# Patient Record
Sex: MALE | Race: BLACK OR AFRICAN AMERICAN | Employment: UNEMPLOYED | URBAN - METROPOLITAN AREA
[De-identification: names, ages, dates, MRNs, and addresses within clinical notes are randomized per-mention and may not be internally consistent; named-entity substitution may affect disease eponyms.]

---

## 2017-12-27 ENCOUNTER — HOSPITAL ENCOUNTER (EMERGENCY)
Facility: CLINIC | Age: 42
Discharge: HOME | End: 2017-12-28
Attending: EMERGENCY MEDICINE | Admitting: EMERGENCY MEDICINE
Payer: MEDICARE

## 2017-12-27 DIAGNOSIS — R07.9 CHEST PAIN, UNSPECIFIED TYPE: ICD-10-CM

## 2017-12-27 DIAGNOSIS — R07.89 OTHER CHEST PAIN: ICD-10-CM

## 2017-12-27 LAB — TROPONIN I BLD-MCNC: 0 UG/L (ref 0–0.1)

## 2017-12-27 PROCEDURE — 93005 ELECTROCARDIOGRAM TRACING: CPT

## 2017-12-27 PROCEDURE — 80048 BASIC METABOLIC PNL TOTAL CA: CPT | Performed by: EMERGENCY MEDICINE

## 2017-12-27 PROCEDURE — 84484 ASSAY OF TROPONIN QUANT: CPT

## 2017-12-27 PROCEDURE — 84484 ASSAY OF TROPONIN QUANT: CPT | Mod: 91 | Performed by: EMERGENCY MEDICINE

## 2017-12-27 PROCEDURE — 99285 EMERGENCY DEPT VISIT HI MDM: CPT | Mod: 25 | Performed by: EMERGENCY MEDICINE

## 2017-12-27 PROCEDURE — 85025 COMPLETE CBC W/AUTO DIFF WBC: CPT | Performed by: EMERGENCY MEDICINE

## 2017-12-27 PROCEDURE — 99285 EMERGENCY DEPT VISIT HI MDM: CPT | Mod: 25

## 2017-12-27 PROCEDURE — 93010 ELECTROCARDIOGRAM REPORT: CPT | Mod: Z6 | Performed by: EMERGENCY MEDICINE

## 2017-12-27 NOTE — ED AVS SNAPSHOT
[facility], Emergency Department    [address]    [place] [address]    Phone:  [phone]    Fax:  [phone]                                       Mary Patrick   MRN: [id number]    Department:  [facility], Emergency Department   Date of Visit:  12/27/2017           Patient Information     Date Of Birth          1975        Your diagnoses for this visit were:     Chest pain, unspecified type        You were seen by Shree Huertas MD.        Discharge Instructions       Please make an appointment to follow up with Your Primary Care Provider to discuss your chest pain and whether you need a stress test.    24 Hour Appointment Hotline       To make an appointment at any [facility], call [phone] ([phone]). If you don't have a family doctor or clinic, we will help you find one. [facility] clinics are conveniently located to serve the needs of you and your family.             Review of your medicines      Our records show that you are taking the medicines listed below. If these are incorrect, please call your family doctor or clinic.        Dose / Directions Last dose taken    ASPIRIN PO   Dose:  81 mg        Take 81 mg by mouth   Refills:  0        LISINOPRIL PO   Dose:  30 mg        Take 30 mg by mouth   Refills:  0        meclizine 12.5 MG tablet   Commonly known as:  ANTIVERT   Dose:  12.5 mg   Quantity:  30 tablet        Take 1 tablet (12.5 mg) by mouth 4 times daily as needed for dizziness   Refills:  0                Procedures and tests performed during your visit     Basic metabolic panel    CBC with platelets differential    Chest XR,  PA & LAT    EKG 12 lead    Troponin I    Troponin POCT      Orders Needing Specimen Collection     None      Pending Results     Date and Time Order Name Status Description    12/28/2017 0011 Chest XR,  PA & LAT Preliminary             Pending Culture Results     No orders found for last 3 day(s).            Pending  "Results Instructions     If you had any lab results that were not finalized at the time of your Discharge, you can call the ED Lab Result RN at [phone]. You will be contacted by this team for any positive Lab results or changes in treatment. The nurses are available 7 days a week from 10A to 6:30P.  You can leave a message 24 hours per day and they will return your call.        Thank you for choosing [facility]       Thank you for choosing [facility] for your care. Our goal is always to provide you with excellent care. Hearing back from our patients is one way we can continue to improve our services. Please take a few minutes to complete the written survey that you may receive in the mail after you visit with us. Thank you!        [facility] Information     [employer] lets you send messages to your doctor, view your test results, renew your prescriptions, schedule appointments and more. To sign up, go to [email or web address] . Click on \"Log in\" on the left side of the screen, which will take you to the Welcome page. Then click on \"Sign up Now\" on the right side of the page.     You will be asked to enter the access code listed below, as well as some personal information. Please follow the directions to create your username and password.     Your access code is: [email or web address]  Expires: 3/28/2018  3:10 AM     Your access code will  in 90 days. If you need help or a new code, please call your [facility] clinic or [phone].        Care EveryWhere ID     This is your Care EveryWhere ID. This could be used by other organizations to access your [facility] medical records  [id number]        Equal Access to Services     [facility]: Hadii jada Melendez, waaxda luqadaha, qaybta kaalmapeg voss, andre alvarez. So [facility] [phone].    ATENCIÓN: Si habla español, tiene a gonzalez disposición servicios gratuitos de asistencia lingüística. Llame al [phone].    We comply with " applicable federal civil rights laws and [place] laws. We do not discriminate on the basis of race, color, national origin, age, disability, sex, sexual orientation, or gender identity.            After Visit Summary       This is your record. Keep this with you and show to your community pharmacist(s) and doctor(s) at your next visit.

## 2017-12-28 ENCOUNTER — APPOINTMENT (OUTPATIENT)
Dept: GENERAL RADIOLOGY | Facility: CLINIC | Age: 42
End: 2017-12-28
Attending: EMERGENCY MEDICINE
Payer: MEDICARE

## 2017-12-28 VITALS
RESPIRATION RATE: 18 BRPM | SYSTOLIC BLOOD PRESSURE: 133 MMHG | WEIGHT: 157.7 LBS | TEMPERATURE: 98.5 F | OXYGEN SATURATION: 100 % | HEART RATE: 77 BPM | BODY MASS INDEX: 21.99 KG/M2 | DIASTOLIC BLOOD PRESSURE: 99 MMHG

## 2017-12-28 LAB
ANION GAP SERPL CALCULATED.3IONS-SCNC: 10 MMOL/L (ref 3–14)
BASOPHILS # BLD AUTO: 0 10E9/L (ref 0–0.2)
BASOPHILS NFR BLD AUTO: 0.3 %
BUN SERPL-MCNC: 12 MG/DL (ref 7–30)
CALCIUM SERPL-MCNC: 8.8 MG/DL (ref 8.5–10.1)
CHLORIDE SERPL-SCNC: 106 MMOL/L (ref 94–109)
CO2 SERPL-SCNC: 26 MMOL/L (ref 20–32)
CREAT SERPL-MCNC: 1.04 MG/DL (ref 0.66–1.25)
DIFFERENTIAL METHOD BLD: NORMAL
EOSINOPHIL # BLD AUTO: 0.1 10E9/L (ref 0–0.7)
EOSINOPHIL NFR BLD AUTO: 2.3 %
ERYTHROCYTE [DISTWIDTH] IN BLOOD BY AUTOMATED COUNT: 13.3 % (ref 10–15)
GFR SERPL CREATININE-BSD FRML MDRD: 79 ML/MIN/1.7M2
GLUCOSE SERPL-MCNC: 96 MG/DL (ref 70–99)
HCT VFR BLD AUTO: 44.6 % (ref 40–53)
HGB BLD-MCNC: 15.2 G/DL (ref 13.3–17.7)
IMM GRANULOCYTES # BLD: 0 10E9/L (ref 0–0.4)
IMM GRANULOCYTES NFR BLD: 0.2 %
INTERPRETATION ECG - MUSE: NORMAL
LYMPHOCYTES # BLD AUTO: 2.8 10E9/L (ref 0.8–5.3)
LYMPHOCYTES NFR BLD AUTO: 45.6 %
MCH RBC QN AUTO: 31.2 PG (ref 26.5–33)
MCHC RBC AUTO-ENTMCNC: 34.1 G/DL (ref 31.5–36.5)
MCV RBC AUTO: 92 FL (ref 78–100)
MONOCYTES # BLD AUTO: 0.5 10E9/L (ref 0–1.3)
MONOCYTES NFR BLD AUTO: 8 %
NEUTROPHILS # BLD AUTO: 2.6 10E9/L (ref 1.6–8.3)
NEUTROPHILS NFR BLD AUTO: 43.6 %
NRBC # BLD AUTO: 0 10*3/UL
NRBC BLD AUTO-RTO: 0 /100
PLATELET # BLD AUTO: 313 10E9/L (ref 150–450)
POTASSIUM SERPL-SCNC: 3.4 MMOL/L (ref 3.4–5.3)
RBC # BLD AUTO: 4.87 10E12/L (ref 4.4–5.9)
SODIUM SERPL-SCNC: 142 MMOL/L (ref 133–144)
TROPONIN I SERPL-MCNC: <0.015 UG/L (ref 0–0.04)
WBC # BLD AUTO: 6 10E9/L (ref 4–11)

## 2017-12-28 PROCEDURE — 71020 XR CHEST 2 VW: CPT

## 2017-12-28 NOTE — DISCHARGE INSTRUCTIONS
Your tests today were normal. Please make an appointment to follow up with Your Primary Care Provider to discuss your chest pain and whether you need a stress test.         *CHEST PAIN, UNCERTAIN CAUSE    Based on your exam today, the exact cause of your chest pain is not certain. Your condition does not seem serious at this time, and your pain does not appear to be coming from your heart. However, sometimes the signs of a serious problem take more time to appear. Therefore, watch for the warning signs listed below.  HOME CARE:  1. Rest today and avoid strenuous activity.  2. Take any prescribed medicine as directed.  FOLLOW UP with your doctor in 1-3 days.   GET PROMPT MEDICAL ATTENTION if any of the following occur:    A change in the type of pain: if it feels different, becomes more severe, lasts longer, or begins to spread into your shoulder, arm, neck, jaw or back    Shortness of breath or increased pain with breathing    Weakness, dizziness, or fainting    Cough with blood or dark colored sputum (phlegm)    Fever over 101  F (38.3  C)    Swelling, pain or redness in one leg    1939-2052 [employer]. [address] [place] [id number]. All rights reserved. This information is not intended as a substitute for professional medical care. Always follow your healthcare professional's instructions.  This information has been modified by your health care provider with permission from the publisher.

## 2017-12-28 NOTE — ED PROVIDER NOTES
"  History     Chief Complaint   Patient presents with     Chest Pain     midsternal \"chest pressure\" started yesterday     HPI  Mary Patrick is a 42 year old male who presents for evaluation of chest pain. The patient reports that yesterday he developed a central chest pressure and concomitant dizziness, which have since persisted. He also does report some mild cough recently. The patient reports a history of similar chest discomfort and notes that approximately 1 year ago he was evaluated for this by CT imagining, which revealed lung nodules. He states that he was advised to follow up with his PCP through [facility] but has not undergone any further workup to further evaluate these nodules. The patient also reports a history of similar dizziness and states that he did previously see ENT for this and was on an antivertigo medication at one point. The patient denies history of heart attack or DVT/PE, and he states that he's not previously undergone stress testing.   His pain is right sided and non radiating.  No prior history of heart disease.      No current facility-administered medications for this encounter.      Current Outpatient Prescriptions   Medication     LISINOPRIL PO     ASPIRIN PO     meclizine (ANTIVERT) 12.5 MG tablet     History reviewed. No pertinent past medical history.    Past Surgical History:   Procedure Laterality Date     ORTHOPEDIC SURGERY         No family history on file.    Social History   Substance Use Topics     Smoking status: Current Some Day Smoker     Packs/day: 0.25     Smokeless tobacco: Never Used     Alcohol use No      No Known Allergies    I have reviewed the Medications, Allergies, Past Medical and Surgical History, and Social History in the Epic system.    Review of Systems  All other systems reviewed and are negative.    Physical Exam   BP: (!) 169/102  Pulse: 77  Heart Rate: 70  Temp: 98  F (36.7  C)  Resp: 16  Weight: 71.5 kg (157 lb 11.2 oz)  SpO2: 100 " %      Physical Exam   Constitutional: He is oriented to person, place, and time. No distress.   HENT:   Head: Normocephalic and atraumatic.   Mouth/Throat: Oropharynx is clear and moist. No oropharyngeal exudate.   Eyes: Conjunctivae are normal. Pupils are equal, round, and reactive to light. Right eye exhibits no discharge. Left eye exhibits no discharge.   Neck: Normal range of motion. Neck supple.   Cardiovascular: Normal rate and intact distal pulses.    No murmur heard.  Pulmonary/Chest: Effort normal. No respiratory distress. He has no wheezes. He has no rales. He exhibits no tenderness.   Abdominal: Soft. He exhibits no distension. There is no tenderness. There is no rebound and no guarding.   Musculoskeletal: Normal range of motion. He exhibits no edema or tenderness.   Neurological: He is alert and oriented to person, place, and time. No cranial nerve deficit. He exhibits normal muscle tone. Coordination normal.   Skin: Skin is warm and dry. No rash noted. He is not diaphoretic.   Psychiatric: He has a normal mood and affect. His behavior is normal. Thought content normal.   Nursing note and vitals reviewed.      ED Course     ED Course     Procedures                EKG Interpretation:      Interpreted by Shree Huertas  Time reviewed:000   Symptoms at time of EKG: chest pain   Rhythm: normal sinus   Rate: normal  Axis: NORMAL  Ectopy: none  Conduction: normal  ST Segments/ T Waves: No ST-T wave changes  Q Waves: none  Comparison to prior: Unchanged    Clinical Impression: normal EKG      Critical Care time:  none             Labs Ordered and Resulted from Time of ED Arrival Up to the Time of Departure from the ED   TROPONIN POCT            Assessments & Plan (with Medical Decision Making)   1.  Chest pain      42-year-old male who presents with anterior chest pain.  EKG with no ischemic changes and troponin is negative.  Chest x-ray with no acute pathology.  He has no risk factors for pulmonary  embolism and his pain is atypical for ACS.  I recommended he follow up with his PCP for further workup of his chest pain.      I have reviewed the nursing notes.    I have reviewed the findings, diagnosis, plan and need for follow up with the patient.    New Prescriptions    No medications on file       Final diagnoses:   None     I, Chris Bell, am serving as a trained medical scribe to document services personally performed by Shree Huertas MD, based on the provider's statements to me.      Shree THIBODEAUX MD, was physically present and have reviewed and verified the accuracy of this note documented by Chris Bell.    12/27/2017   [facility], EMERGENCY DEPARTMENT     Shree Huertas MD  12/29/17 1112
